# Patient Record
Sex: FEMALE | Race: BLACK OR AFRICAN AMERICAN
[De-identification: names, ages, dates, MRNs, and addresses within clinical notes are randomized per-mention and may not be internally consistent; named-entity substitution may affect disease eponyms.]

---

## 2019-10-20 ENCOUNTER — HOSPITAL ENCOUNTER (EMERGENCY)
Dept: HOSPITAL 72 - EMR | Age: 24
Discharge: LEFT BEFORE DISCHARGE | End: 2019-10-20
Payer: COMMERCIAL

## 2019-10-20 VITALS — SYSTOLIC BLOOD PRESSURE: 99 MMHG | DIASTOLIC BLOOD PRESSURE: 70 MMHG

## 2019-10-20 VITALS — BODY MASS INDEX: 24.11 KG/M2 | HEIGHT: 66 IN | WEIGHT: 150 LBS

## 2019-10-20 DIAGNOSIS — R10.33: Primary | ICD-10-CM

## 2019-10-20 DIAGNOSIS — Z32.02: ICD-10-CM

## 2019-10-20 LAB
APPEARANCE UR: CLEAR
APTT PPP: (no result) S
GLUCOSE UR STRIP-MCNC: NEGATIVE MG/DL
KETONES UR QL STRIP: NEGATIVE
LEUKOCYTE ESTERASE UR QL STRIP: (no result)
NITRITE UR QL STRIP: NEGATIVE
PH UR STRIP: 5 [PH] (ref 4.5–8)
PROT UR QL STRIP: (no result)
SP GR UR STRIP: 1.03 (ref 1–1.03)
UROBILINOGEN UR-MCNC: 1 MG/DL (ref 0–1)

## 2019-10-20 PROCEDURE — 99282 EMERGENCY DEPT VISIT SF MDM: CPT

## 2019-10-20 PROCEDURE — 81025 URINE PREGNANCY TEST: CPT

## 2019-10-20 PROCEDURE — 81003 URINALYSIS AUTO W/O SCOPE: CPT

## 2019-10-20 NOTE — NUR
ED Nurse Note:



Patient walked in to ER due to N/V. States that wants to be sure, she is not 
pregnant. AAO x4, VSS at this time, skin is dry warm to touch.

## 2019-10-20 NOTE — NUR
ED Nurse Note:



Patient walked in to ER with her girlfriend, and as soon as her friend was DC 
she left with her AMA. Patient left with steady gait, with all belongings. AAO 
x4, VSS at this time, skin is warm to touch.

## 2019-10-20 NOTE — EMERGENCY ROOM REPORT
History of Present Illness


General


Chief Complaint:  Female Urogenital Problems


Source:  Patient





Present Illness


HPI


This is a 24-year-old female with no past medical history.  She presents with 

chief complaint abdominal pain and went to check to see if she is pregnant.  

Her last menstruation was beginning of the month.  She said it was short only 

last about 3 days.  Denies any fever chills.  She has some abdominal cramp and 

has some nausea and vomiting.  She felt like morning sickness.  Denies dysuria 

frequency but denies any fever chills but nothing made it better.  Nothing made 

it worse.  No other complaint.


Allergies:  


Coded Allergies:  


     No Known Allergies (Unverified , 10/20/19)





Patient History


Past Medical History:  see triage record, old chart reviewed


Past Surgical History:  none


Pertinent Family History:  none


Social History:  Denies: smoking


Last Menstrual Period:  10/2019


Pregnant Now:  No - unk


:  1


Para:  1


Immunizations:  other


Reviewed Nursing Documentation:  PMH: Agreed; PSxH: Agreed





Nursing Documentation-PMH


Past Medical History:  No Stated History





Review of Systems


Eye:  Denies: eye pain, blurred vision


ENT:  Denies: ear pain, nose congestion, throat swelling


Respiratory:  Denies: cough, shortness of breath


Cardiovascular:  Denies: chest pain, palpitations


Gastrointestinal:  Reports: abdominal pain; Denies: diarrhea, nausea, vomiting


Musculoskeletal:  Denies: back pain, joint pain


Skin:  Denies: rash


Neurological:  Denies: headache, numbness


Endocrine:  Denies: increased thirst, increased urine


Hematologic/Lymphatic:  Denies: easy bruising


All Other Systems:  negative except mentioned in HPI





Physical Exam





Vital Signs








  Date Time  Temp Pulse Resp B/P (MAP) Pulse Ox O2 Delivery O2 Flow Rate FiO2


 


10/20/19 04:32 98.1 65 16 99/70 (80) 95   





Vitals normal


Sp02 EP Interpretation:  reviewed, normal


General Appearance:  well appearing, no apparent distress, alert


Head:  normocephalic, atraumatic


Eyes:  bilateral eye PERRL, bilateral eye EOMI


ENT:  hearing grossly normal, normal pharynx


Neck:  full range of motion, supple, no meningismus


Respiratory:  chest non-tender, lungs clear, normal breath sounds


Cardiovascular #1:  regular rate, rhythm, no murmur


Gastrointestinal:  normal bowel sounds, non tender, no mass, no organomegaly, 

no bruit, non-distended


Musculoskeletal:  back normal, gait/station normal, normal range of motion


Psychiatric:  mood/affect normal





Medical Decision Making


Diagnostic Impression:  


 Primary Impression:  


 Abdominal pain


 Qualified Codes:  R10.33 - Periumbilical pain


ER Course


Patient presented with abdominal pain.  Exam is benign.  No evidence of acute 

abdomen.  Not do a pregnancy test at home.  Urine is equivocal here.  Pregnancy 

is negative.  While we were waiting for urinary results, she got up and left 

with a friend.  Her friend also checked in the same time for UTI symptoms.  

Patient did not want to wait.  I suspect that she just want a pregnancy test.





Last Vital Signs








  Date Time  Temp Pulse Resp B/P (MAP) Pulse Ox O2 Delivery O2 Flow Rate FiO2


 


10/20/19 04:42 98.1  16 99/70 95   


 


10/20/19 04:32  65      








Status:  unchanged


Disposition:  AGAINST MEDICAL ADVICE


Condition:  Stable











Regan Joiner MD Oct 20, 2019 05:07